# Patient Record
Sex: FEMALE | Race: WHITE | NOT HISPANIC OR LATINO | Employment: FULL TIME | URBAN - METROPOLITAN AREA
[De-identification: names, ages, dates, MRNs, and addresses within clinical notes are randomized per-mention and may not be internally consistent; named-entity substitution may affect disease eponyms.]

---

## 2023-06-14 ENCOUNTER — APPOINTMENT (OUTPATIENT)
Dept: PRIMARY CARE | Facility: CLINIC | Age: 58
End: 2023-06-14
Payer: COMMERCIAL

## 2024-07-25 ENCOUNTER — APPOINTMENT (OUTPATIENT)
Dept: PRIMARY CARE | Facility: CLINIC | Age: 59
End: 2024-07-25
Payer: COMMERCIAL

## 2025-01-11 ENCOUNTER — APPOINTMENT (OUTPATIENT)
Dept: RADIOLOGY | Facility: HOSPITAL | Age: 60
End: 2025-01-11
Payer: COMMERCIAL

## 2025-01-11 ENCOUNTER — APPOINTMENT (OUTPATIENT)
Dept: CARDIOLOGY | Facility: HOSPITAL | Age: 60
End: 2025-01-11
Payer: COMMERCIAL

## 2025-01-11 ENCOUNTER — HOSPITAL ENCOUNTER (EMERGENCY)
Facility: HOSPITAL | Age: 60
Discharge: HOME | End: 2025-01-11
Attending: INTERNAL MEDICINE
Payer: COMMERCIAL

## 2025-01-11 VITALS
BODY MASS INDEX: 19.44 KG/M2 | SYSTOLIC BLOOD PRESSURE: 127 MMHG | HEIGHT: 66 IN | OXYGEN SATURATION: 100 % | RESPIRATION RATE: 18 BRPM | WEIGHT: 121 LBS | DIASTOLIC BLOOD PRESSURE: 84 MMHG | TEMPERATURE: 98 F | HEART RATE: 79 BPM

## 2025-01-11 DIAGNOSIS — S27.321A CONTUSION OF LEFT LUNG, INITIAL ENCOUNTER: ICD-10-CM

## 2025-01-11 DIAGNOSIS — S22.42XA CLOSED FRACTURE OF MULTIPLE RIBS OF LEFT SIDE, INITIAL ENCOUNTER: Primary | ICD-10-CM
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
## 2025-01-11 DIAGNOSIS — K76.89 LIVER CYST: ICD-10-CM

## 2025-01-11 LAB
ALBUMIN SERPL BCP-MCNC: 3.9 G/DL (ref 3.4–5)
ALP SERPL-CCNC: 92 U/L (ref 33–110)
ALT SERPL W P-5'-P-CCNC: 38 U/L (ref 7–45)
ANION GAP SERPL CALC-SCNC: 11 MMOL/L (ref 10–20)
AST SERPL W P-5'-P-CCNC: 39 U/L (ref 9–39)
BASOPHILS # BLD AUTO: 0.02 X10*3/UL (ref 0–0.1)
BASOPHILS NFR BLD AUTO: 0.3 %
BILIRUB SERPL-MCNC: 0.5 MG/DL (ref 0–1.2)
BUN SERPL-MCNC: 18 MG/DL (ref 6–23)
CALCIUM SERPL-MCNC: 8.9 MG/DL (ref 8.6–10.3)
CARDIAC TROPONIN I PNL SERPL HS: <3 NG/L (ref 0–13)
CARDIAC TROPONIN I PNL SERPL HS: <3 NG/L (ref 0–13)
CHLORIDE SERPL-SCNC: 110 MMOL/L (ref 98–107)
CO2 SERPL-SCNC: 24 MMOL/L (ref 21–32)
CREAT SERPL-MCNC: 0.89 MG/DL (ref 0.5–1.05)
EGFRCR SERPLBLD CKD-EPI 2021: 76 ML/MIN/1.73M*2
EOSINOPHIL # BLD AUTO: 0.01 X10*3/UL (ref 0–0.7)
EOSINOPHIL NFR BLD AUTO: 0.1 %
ERYTHROCYTE [DISTWIDTH] IN BLOOD BY AUTOMATED COUNT: 13.4 % (ref 11.5–14.5)
GLUCOSE SERPL-MCNC: 117 MG/DL (ref 74–99)
HCT VFR BLD AUTO: 40.6 % (ref 36–46)
HGB BLD-MCNC: 13.3 G/DL (ref 12–16)
IMM GRANULOCYTES # BLD AUTO: 0.02 X10*3/UL (ref 0–0.7)
IMM GRANULOCYTES NFR BLD AUTO: 0.3 % (ref 0–0.9)
LYMPHOCYTES # BLD AUTO: 0.99 X10*3/UL (ref 1.2–4.8)
LYMPHOCYTES NFR BLD AUTO: 12.9 %
MAGNESIUM SERPL-MCNC: 1.95 MG/DL (ref 1.6–2.4)
MCH RBC QN AUTO: 28.5 PG (ref 26–34)
MCHC RBC AUTO-ENTMCNC: 32.8 G/DL (ref 32–36)
MCV RBC AUTO: 87 FL (ref 80–100)
MONOCYTES # BLD AUTO: 0.47 X10*3/UL (ref 0.1–1)
MONOCYTES NFR BLD AUTO: 6.1 %
NEUTROPHILS # BLD AUTO: 6.18 X10*3/UL (ref 1.2–7.7)
NEUTROPHILS NFR BLD AUTO: 80.3 %
NRBC BLD-RTO: 0 /100 WBCS (ref 0–0)
PLATELET # BLD AUTO: 272 X10*3/UL (ref 150–450)
POTASSIUM SERPL-SCNC: 4.1 MMOL/L (ref 3.5–5.3)
PROT SERPL-MCNC: 6.1 G/DL (ref 6.4–8.2)
RBC # BLD AUTO: 4.67 X10*6/UL (ref 4–5.2)
SODIUM SERPL-SCNC: 141 MMOL/L (ref 136–145)
WBC # BLD AUTO: 7.7 X10*3/UL (ref 4.4–11.3)

## 2025-01-11 PROCEDURE — 36415 COLL VENOUS BLD VENIPUNCTURE: CPT

## 2025-01-11 PROCEDURE — 93005 ELECTROCARDIOGRAM TRACING: CPT

## 2025-01-11 PROCEDURE — 2500000004 HC RX 250 GENERAL PHARMACY W/ HCPCS (ALT 636 FOR OP/ED)

## 2025-01-11 PROCEDURE — 2500000005 HC RX 250 GENERAL PHARMACY W/O HCPCS

## 2025-01-11 PROCEDURE — 2550000001 HC RX 255 CONTRASTS

## 2025-01-11 PROCEDURE — 74177 CT ABD & PELVIS W/CONTRAST: CPT | Performed by: RADIOLOGY

## 2025-01-11 PROCEDURE — 74177 CT ABD & PELVIS W/CONTRAST: CPT

## 2025-01-11 PROCEDURE — 96374 THER/PROPH/DIAG INJ IV PUSH: CPT

## 2025-01-11 PROCEDURE — 84484 ASSAY OF TROPONIN QUANT: CPT

## 2025-01-11 PROCEDURE — 80053 COMPREHEN METABOLIC PANEL: CPT

## 2025-01-11 PROCEDURE — 83735 ASSAY OF MAGNESIUM: CPT

## 2025-01-11 PROCEDURE — 99285 EMERGENCY DEPT VISIT HI MDM: CPT | Mod: 25 | Performed by: INTERNAL MEDICINE

## 2025-01-11 PROCEDURE — 85025 COMPLETE CBC W/AUTO DIFF WBC: CPT

## 2025-01-11 PROCEDURE — 2500000001 HC RX 250 WO HCPCS SELF ADMINISTERED DRUGS (ALT 637 FOR MEDICARE OP)

## 2025-01-11 PROCEDURE — 71260 CT THORAX DX C+: CPT | Performed by: RADIOLOGY

## 2025-01-11 RX ORDER — KETOROLAC TROMETHAMINE 30 MG/ML
15 INJECTION, SOLUTION INTRAMUSCULAR; INTRAVENOUS ONCE
Status: COMPLETED | OUTPATIENT
Start: 2025-01-11 | End: 2025-01-11

## 2025-01-11 RX ORDER — TRAMADOL HYDROCHLORIDE 50 MG/1
50 TABLET ORAL EVERY 6 HOURS PRN
Qty: 9 TABLET | Refills: 0 | Status: SHIPPED | OUTPATIENT
Start: 2025-01-11 | End: 2025-01-14

## 2025-01-11 RX ORDER — IBUPROFEN 600 MG/1
600 TABLET ORAL EVERY 6 HOURS PRN
Qty: 25 TABLET | Refills: 0 | Status: SHIPPED | OUTPATIENT
Start: 2025-01-11 | End: 2025-01-18

## 2025-01-11 RX ORDER — LIDOCAINE 560 MG/1
1 PATCH PERCUTANEOUS; TOPICAL; TRANSDERMAL ONCE
Status: DISCONTINUED | OUTPATIENT
Start: 2025-01-11 | End: 2025-01-11 | Stop reason: HOSPADM

## 2025-01-11 RX ORDER — ACETAMINOPHEN 325 MG/1
975 TABLET ORAL ONCE
Status: COMPLETED | OUTPATIENT
Start: 2025-01-11 | End: 2025-01-11

## 2025-01-11 RX ORDER — ACETAMINOPHEN 325 MG/1
650 TABLET ORAL EVERY 8 HOURS PRN
Qty: 30 TABLET | Refills: 0 | Status: SHIPPED | OUTPATIENT
Start: 2025-01-11 | End: 2025-01-16

## 2025-01-11 RX ADMIN — IOHEXOL 100 ML: 350 INJECTION, SOLUTION INTRAVENOUS at 12:46

## 2025-01-11 RX ADMIN — KETOROLAC TROMETHAMINE 15 MG: 30 INJECTION, SOLUTION INTRAMUSCULAR at 12:59

## 2025-01-11 RX ADMIN — LIDOCAINE 4% 1 PATCH: 40 PATCH TOPICAL at 12:59

## 2025-01-11 RX ADMIN — ACETAMINOPHEN 975 MG: 325 TABLET, FILM COATED ORAL at 12:58

## 2025-01-11 ASSESSMENT — PAIN DESCRIPTION - PAIN TYPE: TYPE: ACUTE PAIN

## 2025-01-11 ASSESSMENT — PAIN DESCRIPTION - LOCATION: LOCATION: RIB CAGE

## 2025-01-11 ASSESSMENT — COLUMBIA-SUICIDE SEVERITY RATING SCALE - C-SSRS
1. IN THE PAST MONTH, HAVE YOU WISHED YOU WERE DEAD OR WISHED YOU COULD GO TO SLEEP AND NOT WAKE UP?: NO
2. HAVE YOU ACTUALLY HAD ANY THOUGHTS OF KILLING YOURSELF?: NO
6. HAVE YOU EVER DONE ANYTHING, STARTED TO DO ANYTHING, OR PREPARED TO DO ANYTHING TO END YOUR LIFE?: NO

## 2025-01-11 ASSESSMENT — PAIN - FUNCTIONAL ASSESSMENT: PAIN_FUNCTIONAL_ASSESSMENT: 0-10

## 2025-01-11 ASSESSMENT — PAIN SCALES - GENERAL: PAINLEVEL_OUTOF10: 10 - WORST POSSIBLE PAIN

## 2025-01-11 NOTE — ED PROVIDER NOTES
HPI   Chief Complaint   Patient presents with    Rib Injury     left       59-year-old female presents to the ED today with a chief concern of left rib pain.  Patient reports symptoms started 3 days ago.  She reports that she was walking on the steps when she slipped on something wet.  She reports that she slipped down about 2 steps.  She did not hit her head or lose consciousness.  She did not sustain any other injuries.  She denies any bleeding or clotting disorders.  She is not anticoagulated.  She reports that since then she has had left rib pain worse with bending and twisting.  She reports that she has been taking over-the-counter medications for symptoms as needed.  She denies any fevers.  Denies any nausea or vomiting.  Denies any chest pain or shortness of breath.  Denies numbness or tingling or weakness.  She denies any saddle anesthesia or urinary/fecal incontinence or retention.  Denies any history of drug use.  Denies any history of PE or DVT.  She denies any recent travel or surgeries.  Denies hemoptysis.  Denies leg swelling.  Does hurt a little bit if she takes a deep breath into the left side.  Does report that she has been getting over a cold recently.  She did not sustain any symptoms prior to the injury.  She has no other symptoms or concerns at this time.      History provided by:  Patient   used: No            Patient History   Past Medical History:   Diagnosis Date    Personal history of other diseases of the circulatory system 08/24/2015    History of cardiac murmur     Past Surgical History:   Procedure Laterality Date    HYSTERECTOMY  08/24/2015    Hysterectomy    MOUTH SURGERY  03/01/2016    Oral Surgery Tooth Extraction    OTHER SURGICAL HISTORY  10/18/2022    Ureteroplasty    TONSILLECTOMY  08/24/2015    Tonsillectomy     No family history on file.  Social History     Tobacco Use    Smoking status: Not on file    Smokeless tobacco: Not on file   Substance Use Topics     Alcohol use: Not on file    Drug use: Not on file       Physical Exam   ED Triage Vitals [01/11/25 1124]   Temperature Heart Rate Respirations BP   36.7 °C (98 °F) 79 18 127/84      Pulse Ox Temp Source Heart Rate Source Patient Position   100 % Temporal Monitor Sitting      BP Location FiO2 (%)     Left arm --       Physical Exam  General: The pain the patient is sitting comfortably no acute distress.  Vital signs per nursing note.  Skin: No rashes, lesions, scars.  Normal skin turgor.  Head: Atraumatic normocephalic  Neck: The neck is supple.  The trachea is midline.  Lungs: Lungs are clear to auscultation bilaterally.  No rhonchi, wheezing, or rales.  No stridor.  Symmetric chest expansion  Heart: Normal S1-S2 no murmurs, rubs, gallops.  Abdomen: Abdomen is flat, nontender, nondistended.  No rebound tenderness or guarding. No pulsatile mass.    Peripheral vascular: Symmetric 2+ radial, dorsalis pedis, and posterior tibial pulses.  Capillary refill is under 3 seconds.  Neurologic: Alert and oriented x4.    5/5 strength in the upper and lower extremity.  Sensation is intact in the upper and lower extremity.  Sensation is intact in the inner thigh and groin.  Patient is able to cross the legs, extend and flex the knee, point the great toe up, dorsiflex and plantarflex the ankle.  2+ reflexes at the patellar tendon.   Normal gait.  Musculoskeletal: No overlying skin changes throughout the entire back.  No tenderness over the spinal processes throughout the back.  Full range of motion of the back.  There is mild tenderness over the left rib cage.  No cuts or scrapes over the area.  : Deferred      ED Course & MDM   ED Course as of 01/11/25 1711   Sat Jan 11, 2025   1228 CBC shows no evidence of leukocytosis or anemia.  CMP shows normal kidney and liver function. [MC]   1418 Troponin negative [MC]   1440 I personally interpreted the EKG.  Ventricular rate 46 bpm.  VT interval 150 ms.  QRS duration 72 ms.  QT/QTc  428/374 ms.  Patient is in sinus bradycardia at a rate of 46 bpm.  Normal axis.  There is good R wave progression.  No right or left bundle-branch block.  No ST elevations.  Compared with previous EKGs grossly unchanged []   1545 Repeat trop under 3 as well  []   1548 Magnesium normal  [MC]   1635 Inhales 1500ml on inccentive spirometer  []   1639 Spoke with Dr. Oglesby from [facility] trauma who states that from a trauma standpoint they will do nothing for this patient other than pain control. []   1646 No tenderness over t4   [MC]   1708 OARRS was reviewed and was negative for any concerning findings []      ED Course User Index  [] Carlos Enrique Pace PA-C         Diagnoses as of 01/11/25 1711   Closed fracture of multiple ribs of left side, initial encounter   Fall, initial encounter   Contusion of left lung, initial encounter   Liver cyst                 No data recorded                                 Medical Decision Making  59-year-old female presents to the ED today with a chief concern of left rib pain.  Vital signs reassuring.  Patient overall appears well and is nontoxic-appearing.  Patient has full range of motion of the neck without any meningismus.  Satting well on room air.  Not hypoxic.  Not tachycardic.  Afebrile.   She is satting at 100% on room air.  No leukocytosis.  Normal kidney and liver function.  EKG shows no ischemic changes and troponin is stable.  Her CT chest abdomen pelvis shows 3 mildly displaced rib fractures including the 10th, 11th, and 12th ribs posteriorly.  There was a minimal compression deformity involving the T4 vertebra however she is not point tender at this time.  Multiple other incidental findings noted on CT.  These were discussed with patient.  She was given a printed out copy of her CT finding.  No signs of cord compression.  No emergent MRI indicated at this time.  Spoke with  trauma as noted above who recommends outpatient follow-up with pain  control.  Patient was given an incentive spirometer in the ED.  Discussed my pression and findings with patient she feels comfortable returning home.  We discussed very strict precautions including returning for any new or worsening signs or symptoms.  Patient is in agreement with this plan.  She will follow-up with her PCP within 3 days.  She was given Tylenol, ibuprofen, and tramadol for pain as needed.  Discussed use of tramadol with  pharmacy who states this is safe    Differential diagnosis: Rib fracture, contusion, hemothorax    Disposition/treatment  1.  See diagnosis    Shared decision-making was used patient feels comfortable returning home     Patient was advised to follow up with recommended provider in 1 day for another evaluation and exam. I advised patient/guardian to return or go to closest emergency room immediately if symptoms change, get worse, new symptoms develop prior to follow up. If there is no improvement in symptoms in the next 24 hours they are advised to return for further evaluation and exam. I also explained the plan and treatment course. Patient/guardian is in agreement with plan, treatment course, and follow up and states verbally that they will comply.    Patient is homegoing. I discussed the differential; results and discharge plan with the patient and/or family/friend/caregiver if present.  I emphasized the importance of follow-up with the physician I referred them to in the timeframe recommended.  I explained reasons for the patient to return to the Emergency Department. They agreed that if they feel their condition is worsening or if they have any other concern they should call 911 immediately for further assistance. I gave the patient an opportunity to ask all questions they had and answered all of them accordingly. They understand return precautions and discharge instructions. The patient and/or family/friend/caregiver expressed understanding verbally and that they would comply.         This note has been transcribed using voice recognition and may contain grammatical errors, misplaced words, incorrect words, incorrect phrases or other errors.        Procedure  Procedures     Carlos Enrique Pace PA-C  01/11/25 [id number]

## 2025-01-11 NOTE — DISCHARGE INSTRUCTIONS
Return to the ED immediately if you have any new or worsening signs or symptoms  Please follow with your primary care doctor within 3 days

## 2025-01-13 LAB
ATRIAL RATE: 46 BPM
P AXIS: 75 DEGREES
P OFFSET: 204 MS
P ONSET: 151 MS
PR INTERVAL: 150 MS
Q ONSET: 226 MS
QRS COUNT: 7 BEATS
QRS DURATION: 72 MS
QT INTERVAL: 428 MS
QTC CALCULATION(BAZETT): 374 MS
QTC FREDERICIA: 391 MS
R AXIS: 75 DEGREES
T AXIS: 75 DEGREES
T OFFSET: 440 MS
VENTRICULAR RATE: 46 BPM

## 2025-02-20 ENCOUNTER — APPOINTMENT (OUTPATIENT)
Dept: PRIMARY CARE | Facility: CLINIC | Age: 60
End: 2025-02-20
Payer: COMMERCIAL

## 2025-02-20 VITALS
HEART RATE: 68 BPM | DIASTOLIC BLOOD PRESSURE: 70 MMHG | TEMPERATURE: 97.5 F | HEIGHT: 66 IN | WEIGHT: 124.6 LBS | OXYGEN SATURATION: 98 % | BODY MASS INDEX: 20.03 KG/M2 | SYSTOLIC BLOOD PRESSURE: 106 MMHG

## 2025-02-20 DIAGNOSIS — S22.42XD CLOSED FRACTURE OF MULTIPLE RIBS OF LEFT SIDE WITH ROUTINE HEALING: Primary | ICD-10-CM

## 2025-02-20 DIAGNOSIS — F43.10 PTSD (POST-TRAUMATIC STRESS DISORDER): ICD-10-CM

## 2025-02-20 DIAGNOSIS — S22.42XD CLOSED FRACTURE OF MULTIPLE RIBS OF LEFT SIDE WITH ROUTINE HEALING, SUBSEQUENT ENCOUNTER: ICD-10-CM

## 2025-02-20 PROBLEM — F41.0 PANIC ATTACK: Status: RESOLVED | Noted: 2017-03-18 | Resolved: 2025-02-20

## 2025-02-20 PROBLEM — J01.90 ACUTE SINUSITIS: Status: RESOLVED | Noted: 2025-02-20 | Resolved: 2025-02-20

## 2025-02-20 PROBLEM — Q62.8: Status: ACTIVE | Noted: 2019-03-18

## 2025-02-20 PROBLEM — F41.0 PANIC ATTACK: Status: ACTIVE | Noted: 2017-03-18

## 2025-02-20 PROBLEM — J02.9 SORE THROAT: Status: RESOLVED | Noted: 2025-02-20 | Resolved: 2025-02-20

## 2025-02-20 PROBLEM — N13.5 UPJ (URETEROPELVIC JUNCTION) OBSTRUCTION: Status: RESOLVED | Noted: 2025-02-20 | Resolved: 2025-02-20

## 2025-02-20 PROBLEM — M54.41 ACUTE RIGHT-SIDED LOW BACK PAIN WITH RIGHT-SIDED SCIATICA: Status: ACTIVE | Noted: 2019-02-13

## 2025-02-20 PROBLEM — J30.9 ALLERGIC RHINITIS: Status: ACTIVE | Noted: 2025-02-20

## 2025-02-20 PROBLEM — N28.9 DISORDER OF KIDNEY: Status: ACTIVE | Noted: 2025-02-20

## 2025-02-20 PROBLEM — K21.9 GERD (GASTROESOPHAGEAL REFLUX DISEASE): Status: ACTIVE | Noted: 2018-09-04

## 2025-02-20 PROBLEM — N13.5 UPJ (URETEROPELVIC JUNCTION) OBSTRUCTION: Status: ACTIVE | Noted: 2025-02-20

## 2025-02-20 PROBLEM — R53.83 OTHER FATIGUE: Status: ACTIVE | Noted: 2018-09-04

## 2025-02-20 PROBLEM — J02.9 SORE THROAT: Status: ACTIVE | Noted: 2025-02-20

## 2025-02-20 PROBLEM — J01.90 ACUTE SINUSITIS: Status: ACTIVE | Noted: 2025-02-20

## 2025-02-20 PROBLEM — N28.89 URETERAL MASS: Status: ACTIVE | Noted: 2025-02-20

## 2025-02-20 PROBLEM — M54.41 ACUTE RIGHT-SIDED LOW BACK PAIN WITH RIGHT-SIDED SCIATICA: Status: RESOLVED | Noted: 2019-02-13 | Resolved: 2025-02-20

## 2025-02-20 PROBLEM — I34.1 MITRAL VALVE PROLAPSE: Status: ACTIVE | Noted: 2018-09-04

## 2025-02-20 PROCEDURE — 3008F BODY MASS INDEX DOCD: CPT | Performed by: FAMILY MEDICINE

## 2025-02-20 PROCEDURE — 99214 OFFICE O/P EST MOD 30 MIN: CPT | Performed by: FAMILY MEDICINE

## 2025-02-20 PROCEDURE — 1036F TOBACCO NON-USER: CPT | Performed by: FAMILY MEDICINE

## 2025-02-20 RX ORDER — FLUTICASONE PROPIONATE 50 MCG
2 SPRAY, SUSPENSION (ML) NASAL DAILY
COMMUNITY
Start: 2017-04-07 | End: 2025-02-20 | Stop reason: WASHOUT

## 2025-02-20 RX ORDER — ALBUTEROL SULFATE 90 UG/1
2 INHALANT RESPIRATORY (INHALATION)
COMMUNITY
Start: 2024-11-27 | End: 2025-02-20 | Stop reason: WASHOUT

## 2025-02-20 RX ORDER — CETIRIZINE HYDROCHLORIDE 10 MG/1
TABLET ORAL
COMMUNITY

## 2025-02-20 RX ORDER — ACETAMINOPHEN 500 MG
TABLET ORAL
COMMUNITY

## 2025-02-20 RX ORDER — ESTRADIOL 0.1 MG/G
1 CREAM VAGINAL
COMMUNITY
Start: 2024-10-23

## 2025-02-20 ASSESSMENT — ENCOUNTER SYMPTOMS
DEPRESSION: 0
OCCASIONAL FEELINGS OF UNSTEADINESS: 0
LOSS OF SENSATION IN FEET: 0

## 2025-02-20 ASSESSMENT — PATIENT HEALTH QUESTIONNAIRE - PHQ9
SUM OF ALL RESPONSES TO PHQ9 QUESTIONS 1 AND 2: 0
1. LITTLE INTEREST OR PLEASURE IN DOING THINGS: NOT AT ALL
2. FEELING DOWN, DEPRESSED OR HOPELESS: NOT AT ALL
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
## 2025-06-18 ENCOUNTER — TELEPHONE (OUTPATIENT)
Dept: PRIMARY CARE | Facility: CLINIC | Age: 60
End: 2025-06-18
Payer: COMMERCIAL

## 2025-08-05 ENCOUNTER — PATIENT OUTREACH (OUTPATIENT)
Dept: CARE COORDINATION | Facility: CLINIC | Age: 60
End: 2025-08-05
Payer: COMMERCIAL

## 2025-08-05 DIAGNOSIS — Z12.31 ENCOUNTER FOR SCREENING MAMMOGRAM FOR BREAST CANCER: ICD-10-CM
